# Patient Record
Sex: MALE | Race: WHITE | NOT HISPANIC OR LATINO | Employment: OTHER | URBAN - METROPOLITAN AREA
[De-identification: names, ages, dates, MRNs, and addresses within clinical notes are randomized per-mention and may not be internally consistent; named-entity substitution may affect disease eponyms.]

---

## 2024-05-08 ENCOUNTER — APPOINTMENT (OUTPATIENT)
Dept: RADIOLOGY | Facility: HOSPITAL | Age: 49
End: 2024-05-08
Payer: COMMERCIAL

## 2024-05-08 ENCOUNTER — HOSPITAL ENCOUNTER (EMERGENCY)
Facility: HOSPITAL | Age: 49
Discharge: HOME | End: 2024-05-08
Attending: STUDENT IN AN ORGANIZED HEALTH CARE EDUCATION/TRAINING PROGRAM
Payer: COMMERCIAL

## 2024-05-08 VITALS
SYSTOLIC BLOOD PRESSURE: 166 MMHG | RESPIRATION RATE: 18 BRPM | TEMPERATURE: 98 F | WEIGHT: 205 LBS | HEART RATE: 77 BPM | HEIGHT: 68 IN | OXYGEN SATURATION: 100 % | DIASTOLIC BLOOD PRESSURE: 105 MMHG | BODY MASS INDEX: 31.07 KG/M2

## 2024-05-08 DIAGNOSIS — M79.632 PAIN OF LEFT FOREARM: ICD-10-CM

## 2024-05-08 DIAGNOSIS — T14.8XXA ANIMAL BITE: Primary | ICD-10-CM

## 2024-05-08 PROCEDURE — 90471 IMMUNIZATION ADMIN: CPT | Performed by: STUDENT IN AN ORGANIZED HEALTH CARE EDUCATION/TRAINING PROGRAM

## 2024-05-08 PROCEDURE — 2500000001 HC RX 250 WO HCPCS SELF ADMINISTERED DRUGS (ALT 637 FOR MEDICARE OP): Performed by: STUDENT IN AN ORGANIZED HEALTH CARE EDUCATION/TRAINING PROGRAM

## 2024-05-08 PROCEDURE — 2500000004 HC RX 250 GENERAL PHARMACY W/ HCPCS (ALT 636 FOR OP/ED): Performed by: STUDENT IN AN ORGANIZED HEALTH CARE EDUCATION/TRAINING PROGRAM

## 2024-05-08 PROCEDURE — 99283 EMERGENCY DEPT VISIT LOW MDM: CPT | Mod: 25 | Performed by: STUDENT IN AN ORGANIZED HEALTH CARE EDUCATION/TRAINING PROGRAM

## 2024-05-08 PROCEDURE — 99284 EMERGENCY DEPT VISIT MOD MDM: CPT | Performed by: STUDENT IN AN ORGANIZED HEALTH CARE EDUCATION/TRAINING PROGRAM

## 2024-05-08 PROCEDURE — 90715 TDAP VACCINE 7 YRS/> IM: CPT | Performed by: STUDENT IN AN ORGANIZED HEALTH CARE EDUCATION/TRAINING PROGRAM

## 2024-05-08 PROCEDURE — 73090 X-RAY EXAM OF FOREARM: CPT | Mod: LEFT SIDE | Performed by: RADIOLOGY

## 2024-05-08 PROCEDURE — 73090 X-RAY EXAM OF FOREARM: CPT | Mod: LT

## 2024-05-08 RX ORDER — AMOXICILLIN AND CLAVULANATE POTASSIUM 875; 125 MG/1; MG/1
1 TABLET, FILM COATED ORAL EVERY 12 HOURS
Qty: 20 TABLET | Refills: 0 | Status: SHIPPED | OUTPATIENT
Start: 2024-05-08 | End: 2024-05-18

## 2024-05-08 RX ORDER — HYDROCODONE BITARTRATE AND ACETAMINOPHEN 5; 325 MG/1; MG/1
1 TABLET ORAL EVERY 6 HOURS PRN
Qty: 12 TABLET | Refills: 0 | Status: SHIPPED | OUTPATIENT
Start: 2024-05-08 | End: 2024-05-11

## 2024-05-08 RX ORDER — AMOXICILLIN AND CLAVULANATE POTASSIUM 875; 125 MG/1; MG/1
1 TABLET, FILM COATED ORAL ONCE
Status: COMPLETED | OUTPATIENT
Start: 2024-05-08 | End: 2024-05-08

## 2024-05-08 RX ORDER — IBUPROFEN 200 MG
1 TABLET ORAL ONCE
Status: DISCONTINUED | OUTPATIENT
Start: 2024-05-08 | End: 2024-05-08 | Stop reason: HOSPADM

## 2024-05-08 RX ADMIN — AMOXICILLIN AND CLAVULANATE POTASSIUM 1 TABLET: 875; 125 TABLET, FILM COATED ORAL at 02:25

## 2024-05-08 RX ADMIN — TETANUS TOXOID, REDUCED DIPHTHERIA TOXOID AND ACELLULAR PERTUSSIS VACCINE, ADSORBED 0.5 ML: 5; 2.5; 8; 8; 2.5 SUSPENSION INTRAMUSCULAR at 01:51

## 2024-05-08 ASSESSMENT — LIFESTYLE VARIABLES
EVER HAD A DRINK FIRST THING IN THE MORNING TO STEADY YOUR NERVES TO GET RID OF A HANGOVER: NO
HAVE PEOPLE ANNOYED YOU BY CRITICIZING YOUR DRINKING: NO
TOTAL SCORE: 0
EVER FELT BAD OR GUILTY ABOUT YOUR DRINKING: NO
HAVE YOU EVER FELT YOU SHOULD CUT DOWN ON YOUR DRINKING: NO

## 2024-05-08 ASSESSMENT — PAIN DESCRIPTION - PAIN TYPE: TYPE: ACUTE PAIN

## 2024-05-08 ASSESSMENT — PAIN SCALES - GENERAL: PAINLEVEL_OUTOF10: 6

## 2024-05-08 ASSESSMENT — COLUMBIA-SUICIDE SEVERITY RATING SCALE - C-SSRS
1. IN THE PAST MONTH, HAVE YOU WISHED YOU WERE DEAD OR WISHED YOU COULD GO TO SLEEP AND NOT WAKE UP?: NO
6. HAVE YOU EVER DONE ANYTHING, STARTED TO DO ANYTHING, OR PREPARED TO DO ANYTHING TO END YOUR LIFE?: NO
2. HAVE YOU ACTUALLY HAD ANY THOUGHTS OF KILLING YOURSELF?: NO

## 2024-05-08 ASSESSMENT — PAIN - FUNCTIONAL ASSESSMENT: PAIN_FUNCTIONAL_ASSESSMENT: 0-10

## 2024-05-08 ASSESSMENT — PAIN DESCRIPTION - LOCATION: LOCATION: ARM

## 2024-05-08 NOTE — ED PROVIDER NOTES
HPI   Chief Complaint   Patient presents with    Animal Bite       This is a 48-year-old male, otherwise healthy with no chronic medical issues presents to the ED brought in by self for left-sided forearm pain, this started 3 hours ago after he was bitten by his neighbors dog when he was trying to break up a fight between his neighbors dog and his dog.  He was in his normal state of health prior to this.                          Merrill Coma Scale Score: 15                     Patient History   History reviewed. No pertinent past medical history.  History reviewed. No pertinent surgical history.  No family history on file.  Social History     Tobacco Use    Smoking status: Every Day     Current packs/day: 0.50     Types: Cigarettes    Smokeless tobacco: Never   Substance Use Topics    Alcohol use: Yes     Alcohol/week: 1.0 standard drink of alcohol     Types: 1 Cans of beer per week    Drug use: Yes     Types: Cocaine       Physical Exam   ED Triage Vitals [05/08/24 0008]   Temperature Heart Rate Respirations BP   36.7 °C (98 °F) 96 18 (!) 143/102      Pulse Ox Temp Source Heart Rate Source Patient Position   99 % Temporal Monitor Sitting      BP Location FiO2 (%)     Right arm --       Physical Exam  Constitutional:       Appearance: Normal appearance.   HENT:      Head: Normocephalic and atraumatic.      Mouth/Throat:      Mouth: Mucous membranes are moist.   Eyes:      Extraocular Movements: Extraocular movements intact.   Cardiovascular:      Rate and Rhythm: Normal rate and regular rhythm.      Heart sounds: Normal heart sounds. No murmur heard.  Pulmonary:      Effort: Pulmonary effort is normal. No respiratory distress.      Breath sounds: Normal breath sounds. No wheezing.   Abdominal:      General: There is no distension.      Palpations: Abdomen is soft.      Tenderness: There is no abdominal tenderness. There is no guarding.   Musculoskeletal:      Right lower leg: No edema.      Left lower leg: No edema.       Comments: There are 2 puncture wounds over the dorsum of the left forearm and 1 over the ulnar aspect of the left forearm distally.  Around the puncture wound of the left distal forearm on the ulnar side, there is hematoma noted approximately 3 cm that is tender to palpation.  He has intact sensation throughout the entire forearm,  strength is 5/5 bilaterally and he is able to flex and extend his fingers bilaterally.   Skin:     General: Skin is warm and dry.   Neurological:      General: No focal deficit present.      Mental Status: He is alert and oriented to person, place, and time.   Psychiatric:         Mood and Affect: Mood normal.         Behavior: Behavior normal.         ED Course & MDM   ED Course as of 05/08/24 0255   Wed May 08, 2024   0141 XR forearm left 2 views [ZH]      ED Course User Index  [ZH] Steven Armenta DO         Diagnoses as of 05/08/24 0255   Animal bite   Pain of left forearm       Medical Decision Making  Hemodynamically stable well-appearing comfortable on arrival to the ED.  He was bitten in the left forearm, we update his tetanus, based on his exam, lower concern for foreign body or fracture or compartment syndrome.  He has great  strength distally and he has intact sensation distally.  He is able to range all of his fingers and full flexion and extension without difficulty, which decreases my suspicion for tendon involvement.  Will obtain plain film    The plain film did demonstrate no acute osseous abnormality or radiopaque foreign body.  There was some soft tissue swelling noted.  His tetanus was updated and he got a dose of Augmentin in her stay in the ED.  He will be discharged home with outpatient hand surgery follow-up and given strict return precautions for any new or worsening symptoms such as worsening pain, loss of sensation.    He will be given Ortho/hand surgery follow-up in the next day.    Discussed with the attending  Isaac Giang DO PGY-4  Emergency  Medicine        Procedure  Procedures     Isaac Giang DO  Resident  05/08/24 0257

## 2024-05-08 NOTE — Clinical Note
Maurice Aminachapitoelijahmaki was seen and treated in our emergency department on 5/8/2024.  He may return to work on 05/10/2024.       If you have any questions or concerns, please don't hesitate to call.      Babak Godwin, DO

## 2024-05-08 NOTE — ED TRIAGE NOTES
Pt here for left arm dog bite. Pt was breaking up a dog fight between his dog and two pitballs. Bleeding controlled.

## 2024-05-08 NOTE — DISCHARGE INSTRUCTIONS
Please follow-up with the provided orthopedic surgeon.  Return to the emergency department if you develop any new, concerning, worsening symptoms such as difficulty with opening closing her hands, numbness in your fingers, severely worsening pain or fever/chills.  Take your antibiotics as prescribed.

## 2025-04-21 ENCOUNTER — APPOINTMENT (OUTPATIENT)
Dept: CARDIOLOGY | Facility: HOSPITAL | Age: 50
End: 2025-04-21
Payer: COMMERCIAL

## 2025-04-21 ENCOUNTER — HOSPITAL ENCOUNTER (EMERGENCY)
Facility: HOSPITAL | Age: 50
Discharge: LEFT BEFORE DISCHARGE | End: 2025-04-22
Attending: EMERGENCY MEDICINE
Payer: COMMERCIAL

## 2025-04-21 ENCOUNTER — APPOINTMENT (OUTPATIENT)
Dept: RADIOLOGY | Facility: HOSPITAL | Age: 50
End: 2025-04-21
Payer: COMMERCIAL

## 2025-04-21 VITALS
OXYGEN SATURATION: 98 % | DIASTOLIC BLOOD PRESSURE: 88 MMHG | TEMPERATURE: 97.5 F | RESPIRATION RATE: 20 BRPM | SYSTOLIC BLOOD PRESSURE: 150 MMHG | WEIGHT: 195 LBS | BODY MASS INDEX: 27.92 KG/M2 | HEART RATE: 62 BPM | HEIGHT: 70 IN

## 2025-04-21 DIAGNOSIS — I95.9 HYPOTENSION, UNSPECIFIED HYPOTENSION TYPE: ICD-10-CM

## 2025-04-21 DIAGNOSIS — R55 SYNCOPE, UNSPECIFIED SYNCOPE TYPE: ICD-10-CM

## 2025-04-21 DIAGNOSIS — M25.512 LEFT SHOULDER PAIN, UNSPECIFIED CHRONICITY: ICD-10-CM

## 2025-04-21 DIAGNOSIS — R00.1 BRADYCARDIA: ICD-10-CM

## 2025-04-21 DIAGNOSIS — S09.90XA CLOSED HEAD INJURY, INITIAL ENCOUNTER: Primary | ICD-10-CM

## 2025-04-21 LAB
ABO GROUP (TYPE) IN BLOOD: NORMAL
ALBUMIN SERPL BCP-MCNC: 4.2 G/DL (ref 3.4–5)
ALP SERPL-CCNC: 82 U/L (ref 33–120)
ALT SERPL W P-5'-P-CCNC: 17 U/L (ref 10–52)
AMPHETAMINES UR QL SCN: ABNORMAL
ANION GAP BLDV CALCULATED.4IONS-SCNC: 8 MMOL/L (ref 10–25)
ANION GAP SERPL CALC-SCNC: 10 MMOL/L (ref 10–20)
ANTIBODY SCREEN: NORMAL
AST SERPL W P-5'-P-CCNC: 18 U/L (ref 9–39)
BARBITURATES UR QL SCN: ABNORMAL
BASE EXCESS BLDV CALC-SCNC: -1.5 MMOL/L (ref -2–3)
BASOPHILS # BLD AUTO: 0.12 X10*3/UL (ref 0–0.1)
BASOPHILS NFR BLD AUTO: 1 %
BENZODIAZ UR QL SCN: ABNORMAL
BILIRUB SERPL-MCNC: 0.3 MG/DL (ref 0–1.2)
BODY TEMPERATURE: ABNORMAL
BUN SERPL-MCNC: 15 MG/DL (ref 6–23)
BZE UR QL SCN: ABNORMAL
CA-I BLDV-SCNC: 1.22 MMOL/L (ref 1.1–1.33)
CALCIUM SERPL-MCNC: 8.7 MG/DL (ref 8.6–10.3)
CANNABINOIDS UR QL SCN: ABNORMAL
CARDIAC TROPONIN I PNL SERPL HS: 5 NG/L (ref 0–20)
CARDIAC TROPONIN I PNL SERPL HS: 5 NG/L (ref 0–20)
CHLORIDE BLDV-SCNC: 105 MMOL/L (ref 98–107)
CHLORIDE SERPL-SCNC: 104 MMOL/L (ref 98–107)
CK SERPL-CCNC: 76 U/L (ref 0–325)
CO2 SERPL-SCNC: 25 MMOL/L (ref 21–32)
CREAT SERPL-MCNC: 1.2 MG/DL (ref 0.5–1.3)
EGFRCR SERPLBLD CKD-EPI 2021: 75 ML/MIN/1.73M*2
EOSINOPHIL # BLD AUTO: 0.36 X10*3/UL (ref 0–0.7)
EOSINOPHIL NFR BLD AUTO: 3 %
ERYTHROCYTE [DISTWIDTH] IN BLOOD BY AUTOMATED COUNT: 13 % (ref 11.5–14.5)
ETHANOL SERPL-MCNC: 42 MG/DL
FENTANYL+NORFENTANYL UR QL SCN: ABNORMAL
GLUCOSE BLDV-MCNC: 113 MG/DL (ref 74–99)
GLUCOSE SERPL-MCNC: 116 MG/DL (ref 74–99)
HCO3 BLDV-SCNC: 24.3 MMOL/L (ref 22–26)
HCT VFR BLD AUTO: 38.3 % (ref 41–52)
HCT VFR BLD EST: 39 % (ref 41–52)
HGB BLD-MCNC: 12.7 G/DL (ref 13.5–17.5)
HGB BLDV-MCNC: 12.9 G/DL (ref 13.5–17.5)
HOLD SPECIMEN: NORMAL
IMM GRANULOCYTES # BLD AUTO: 0.07 X10*3/UL (ref 0–0.7)
IMM GRANULOCYTES NFR BLD AUTO: 0.6 % (ref 0–0.9)
INHALED O2 CONCENTRATION: 28 %
LACTATE BLDV-SCNC: 2.2 MMOL/L (ref 0.4–2)
LYMPHOCYTES # BLD AUTO: 3.16 X10*3/UL (ref 1.2–4.8)
LYMPHOCYTES NFR BLD AUTO: 26.4 %
MCH RBC QN AUTO: 29.3 PG (ref 26–34)
MCHC RBC AUTO-ENTMCNC: 33.2 G/DL (ref 32–36)
MCV RBC AUTO: 88 FL (ref 80–100)
METHADONE UR QL SCN: ABNORMAL
MONOCYTES # BLD AUTO: 0.96 X10*3/UL (ref 0.1–1)
MONOCYTES NFR BLD AUTO: 8 %
NEUTROPHILS # BLD AUTO: 7.32 X10*3/UL (ref 1.2–7.7)
NEUTROPHILS NFR BLD AUTO: 61 %
NRBC BLD-RTO: 0 /100 WBCS (ref 0–0)
OPIATES UR QL SCN: ABNORMAL
OXYCODONE+OXYMORPHONE UR QL SCN: ABNORMAL
OXYHGB MFR BLDV: 52.1 % (ref 45–75)
PCO2 BLDV: 44 MM HG (ref 41–51)
PCP UR QL SCN: ABNORMAL
PH BLDV: 7.35 PH (ref 7.33–7.43)
PLATELET # BLD AUTO: 322 X10*3/UL (ref 150–450)
PO2 BLDV: 30 MM HG (ref 35–45)
POTASSIUM BLDV-SCNC: 3.4 MMOL/L (ref 3.5–5.3)
POTASSIUM SERPL-SCNC: 3.3 MMOL/L (ref 3.5–5.3)
PROT SERPL-MCNC: 6.3 G/DL (ref 6.4–8.2)
RBC # BLD AUTO: 4.34 X10*6/UL (ref 4.5–5.9)
RH FACTOR (ANTIGEN D): NORMAL
SAO2 % BLDV: 55 % (ref 45–75)
SODIUM BLDV-SCNC: 134 MMOL/L (ref 136–145)
SODIUM SERPL-SCNC: 136 MMOL/L (ref 136–145)
WBC # BLD AUTO: 12 X10*3/UL (ref 4.4–11.3)

## 2025-04-21 PROCEDURE — 86901 BLOOD TYPING SEROLOGIC RH(D): CPT | Performed by: EMERGENCY MEDICINE

## 2025-04-21 PROCEDURE — 93005 ELECTROCARDIOGRAM TRACING: CPT

## 2025-04-21 PROCEDURE — 72125 CT NECK SPINE W/O DYE: CPT | Performed by: RADIOLOGY

## 2025-04-21 PROCEDURE — 72125 CT NECK SPINE W/O DYE: CPT

## 2025-04-21 PROCEDURE — 82077 ASSAY SPEC XCP UR&BREATH IA: CPT | Performed by: EMERGENCY MEDICINE

## 2025-04-21 PROCEDURE — 2500000002 HC RX 250 W HCPCS SELF ADMINISTERED DRUGS (ALT 637 FOR MEDICARE OP, ALT 636 FOR OP/ED): Performed by: EMERGENCY MEDICINE

## 2025-04-21 PROCEDURE — 73030 X-RAY EXAM OF SHOULDER: CPT | Mod: BILATERAL PROCEDURE | Performed by: RADIOLOGY

## 2025-04-21 PROCEDURE — 2500000001 HC RX 250 WO HCPCS SELF ADMINISTERED DRUGS (ALT 637 FOR MEDICARE OP): Performed by: EMERGENCY MEDICINE

## 2025-04-21 PROCEDURE — G0390 TRAUMA RESPONS W/HOSP CRITI: HCPCS

## 2025-04-21 PROCEDURE — 71275 CT ANGIOGRAPHY CHEST: CPT

## 2025-04-21 PROCEDURE — 72170 X-RAY EXAM OF PELVIS: CPT | Performed by: RADIOLOGY

## 2025-04-21 PROCEDURE — 82550 ASSAY OF CK (CPK): CPT

## 2025-04-21 PROCEDURE — 73630 X-RAY EXAM OF FOOT: CPT | Mod: BILATERAL PROCEDURE | Performed by: RADIOLOGY

## 2025-04-21 PROCEDURE — 99285 EMERGENCY DEPT VISIT HI MDM: CPT | Mod: 25

## 2025-04-21 PROCEDURE — 70450 CT HEAD/BRAIN W/O DYE: CPT

## 2025-04-21 PROCEDURE — 2500000004 HC RX 250 GENERAL PHARMACY W/ HCPCS (ALT 636 FOR OP/ED): Mod: JZ | Performed by: EMERGENCY MEDICINE

## 2025-04-21 PROCEDURE — 96374 THER/PROPH/DIAG INJ IV PUSH: CPT

## 2025-04-21 PROCEDURE — 84484 ASSAY OF TROPONIN QUANT: CPT | Performed by: EMERGENCY MEDICINE

## 2025-04-21 PROCEDURE — 2500000004 HC RX 250 GENERAL PHARMACY W/ HCPCS (ALT 636 FOR OP/ED): Mod: JZ

## 2025-04-21 PROCEDURE — 84075 ASSAY ALKALINE PHOSPHATASE: CPT | Performed by: EMERGENCY MEDICINE

## 2025-04-21 PROCEDURE — 2500000005 HC RX 250 GENERAL PHARMACY W/O HCPCS: Performed by: EMERGENCY MEDICINE

## 2025-04-21 PROCEDURE — 2550000001 HC RX 255 CONTRASTS: Performed by: EMERGENCY MEDICINE

## 2025-04-21 PROCEDURE — 85025 COMPLETE CBC W/AUTO DIFF WBC: CPT | Performed by: EMERGENCY MEDICINE

## 2025-04-21 PROCEDURE — 99291 CRITICAL CARE FIRST HOUR: CPT | Performed by: EMERGENCY MEDICINE

## 2025-04-21 PROCEDURE — 96375 TX/PRO/DX INJ NEW DRUG ADDON: CPT

## 2025-04-21 PROCEDURE — 71101 X-RAY EXAM UNILAT RIBS/CHEST: CPT | Mod: RT

## 2025-04-21 PROCEDURE — 84132 ASSAY OF SERUM POTASSIUM: CPT | Performed by: EMERGENCY MEDICINE

## 2025-04-21 PROCEDURE — 36415 COLL VENOUS BLD VENIPUNCTURE: CPT | Performed by: EMERGENCY MEDICINE

## 2025-04-21 PROCEDURE — 73030 X-RAY EXAM OF SHOULDER: CPT | Mod: 50

## 2025-04-21 PROCEDURE — 72170 X-RAY EXAM OF PELVIS: CPT

## 2025-04-21 PROCEDURE — 74177 CT ABD & PELVIS W/CONTRAST: CPT

## 2025-04-21 PROCEDURE — 99285 EMERGENCY DEPT VISIT HI MDM: CPT | Mod: 25 | Performed by: EMERGENCY MEDICINE

## 2025-04-21 PROCEDURE — 96361 HYDRATE IV INFUSION ADD-ON: CPT

## 2025-04-21 PROCEDURE — 80307 DRUG TEST PRSMV CHEM ANLYZR: CPT | Performed by: EMERGENCY MEDICINE

## 2025-04-21 PROCEDURE — 71101 X-RAY EXAM UNILAT RIBS/CHEST: CPT | Mod: RIGHT SIDE | Performed by: RADIOLOGY

## 2025-04-21 PROCEDURE — 70450 CT HEAD/BRAIN W/O DYE: CPT | Performed by: RADIOLOGY

## 2025-04-21 RX ORDER — DULOXETIN HYDROCHLORIDE 60 MG/1
60 CAPSULE, DELAYED RELEASE ORAL NIGHTLY
COMMUNITY

## 2025-04-21 RX ORDER — FENTANYL CITRATE 50 UG/ML
25 INJECTION, SOLUTION INTRAMUSCULAR; INTRAVENOUS ONCE
Status: COMPLETED | OUTPATIENT
Start: 2025-04-21 | End: 2025-04-21

## 2025-04-21 RX ORDER — ORPHENADRINE CITRATE 100 MG/1
100 TABLET, EXTENDED RELEASE ORAL ONCE
Status: COMPLETED | OUTPATIENT
Start: 2025-04-21 | End: 2025-04-21

## 2025-04-21 RX ORDER — LIDOCAINE 560 MG/1
1 PATCH PERCUTANEOUS; TOPICAL; TRANSDERMAL ONCE
Status: DISCONTINUED | OUTPATIENT
Start: 2025-04-21 | End: 2025-04-22 | Stop reason: HOSPADM

## 2025-04-21 RX ORDER — CYCLOBENZAPRINE HCL 10 MG
10 TABLET ORAL 3 TIMES DAILY PRN
COMMUNITY

## 2025-04-21 RX ORDER — METAXALONE 800 MG/1
800 TABLET ORAL EVERY 8 HOURS PRN
Qty: 21 TABLET | Refills: 0 | Status: SHIPPED | OUTPATIENT
Start: 2025-04-21 | End: 2025-04-28

## 2025-04-21 RX ORDER — KETOROLAC TROMETHAMINE 15 MG/ML
15 INJECTION, SOLUTION INTRAMUSCULAR; INTRAVENOUS ONCE
Status: COMPLETED | OUTPATIENT
Start: 2025-04-21 | End: 2025-04-21

## 2025-04-21 RX ORDER — DULOXETIN HYDROCHLORIDE 30 MG/1
30 CAPSULE, DELAYED RELEASE ORAL NIGHTLY
COMMUNITY

## 2025-04-21 RX ORDER — LIDOCAINE 560 MG/1
1 PATCH PERCUTANEOUS; TOPICAL; TRANSDERMAL DAILY
Qty: 12 PATCH | Refills: 0 | Status: SHIPPED | OUTPATIENT
Start: 2025-04-21 | End: 2025-05-03

## 2025-04-21 RX ORDER — OXYCODONE AND ACETAMINOPHEN 5; 325 MG/1; MG/1
1 TABLET ORAL ONCE
Refills: 0 | Status: COMPLETED | OUTPATIENT
Start: 2025-04-21 | End: 2025-04-21

## 2025-04-21 RX ORDER — IBUPROFEN 600 MG/1
600 TABLET ORAL EVERY 6 HOURS PRN
Qty: 21 TABLET | Refills: 0 | Status: SHIPPED | OUTPATIENT
Start: 2025-04-21 | End: 2025-04-28

## 2025-04-21 RX ADMIN — OXYCODONE HYDROCHLORIDE AND ACETAMINOPHEN 1 TABLET: 5; 325 TABLET ORAL at 22:42

## 2025-04-21 RX ADMIN — ORPHENADRINE CITRATE 100 MG: 100 TABLET, EXTENDED RELEASE ORAL at 22:42

## 2025-04-21 RX ADMIN — LIDOCAINE 4% 1 PATCH: 40 PATCH TOPICAL at 22:34

## 2025-04-21 RX ADMIN — FENTANYL CITRATE 25 MCG: 50 INJECTION, SOLUTION INTRAMUSCULAR; INTRAVENOUS at 20:43

## 2025-04-21 RX ADMIN — KETOROLAC TROMETHAMINE 15 MG: 15 INJECTION, SOLUTION INTRAMUSCULAR; INTRAVENOUS at 22:34

## 2025-04-21 RX ADMIN — IOHEXOL 100 ML: 350 INJECTION, SOLUTION INTRAVENOUS at 20:10

## 2025-04-21 RX ADMIN — SODIUM CHLORIDE, SODIUM LACTATE, POTASSIUM CHLORIDE, AND CALCIUM CHLORIDE 1000 ML: .6; .31; .03; .02 INJECTION, SOLUTION INTRAVENOUS at 19:36

## 2025-04-21 RX ADMIN — SODIUM CHLORIDE, SODIUM LACTATE, POTASSIUM CHLORIDE, AND CALCIUM CHLORIDE 1000 ML: .6; .31; .03; .02 INJECTION, SOLUTION INTRAVENOUS at 20:31

## 2025-04-21 ASSESSMENT — LIFESTYLE VARIABLES
EVER HAD A DRINK FIRST THING IN THE MORNING TO STEADY YOUR NERVES TO GET RID OF A HANGOVER: YES
EVER FELT BAD OR GUILTY ABOUT YOUR DRINKING: NO
TOTAL SCORE: 2
HAVE PEOPLE ANNOYED YOU BY CRITICIZING YOUR DRINKING: NO
HAVE YOU EVER FELT YOU SHOULD CUT DOWN ON YOUR DRINKING: YES

## 2025-04-21 ASSESSMENT — PAIN DESCRIPTION - PAIN TYPE: TYPE: ACUTE PAIN

## 2025-04-21 ASSESSMENT — PAIN DESCRIPTION - ORIENTATION: ORIENTATION: LEFT

## 2025-04-21 ASSESSMENT — PAIN DESCRIPTION - LOCATION
LOCATION: OTHER (COMMENT)
LOCATION: OTHER (COMMENT)

## 2025-04-21 ASSESSMENT — PAIN - FUNCTIONAL ASSESSMENT: PAIN_FUNCTIONAL_ASSESSMENT: 0-10

## 2025-04-21 ASSESSMENT — PAIN SCALES - GENERAL
PAINLEVEL_OUTOF10: 8
PAINLEVEL_OUTOF10: 10 - WORST POSSIBLE PAIN
PAINLEVEL_OUTOF10: 7
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
## 2025-04-22 LAB
ATRIAL RATE: 59 BPM
P AXIS: 61 DEGREES
P OFFSET: 207 MS
P ONSET: 148 MS
PR INTERVAL: 146 MS
Q ONSET: 221 MS
Q ONSET: 222 MS
QRS COUNT: 10 BEATS
QRS COUNT: 9 BEATS
QRS DURATION: 108 MS
QRS DURATION: 114 MS
QT INTERVAL: 428 MS
QT INTERVAL: 464 MS
QTC CALCULATION(BAZETT): 423 MS
QTC CALCULATION(BAZETT): 443 MS
QTC FREDERICIA: 425 MS
QTC FREDERICIA: 450 MS
R AXIS: -10 DEGREES
R AXIS: -28 DEGREES
T AXIS: -5 DEGREES
T AXIS: 30 DEGREES
T OFFSET: 435 MS
T OFFSET: 454 MS
VENTRICULAR RATE: 55 BPM
VENTRICULAR RATE: 59 BPM

## 2025-08-05 ENCOUNTER — HOSPITAL ENCOUNTER (OUTPATIENT)
Dept: CARDIOLOGY | Facility: HOSPITAL | Age: 50
Discharge: HOME | End: 2025-08-05
Payer: COMMERCIAL

## 2025-08-05 ENCOUNTER — HOSPITAL ENCOUNTER (EMERGENCY)
Facility: HOSPITAL | Age: 50
Discharge: LEFT BEFORE DISCHARGE | End: 2025-08-05
Attending: EMERGENCY MEDICINE
Payer: COMMERCIAL

## 2025-08-05 VITALS
OXYGEN SATURATION: 100 % | HEIGHT: 71 IN | RESPIRATION RATE: 16 BRPM | TEMPERATURE: 98.6 F | BODY MASS INDEX: 25.2 KG/M2 | SYSTOLIC BLOOD PRESSURE: 111 MMHG | HEART RATE: 79 BPM | WEIGHT: 180 LBS | DIASTOLIC BLOOD PRESSURE: 71 MMHG

## 2025-08-05 DIAGNOSIS — R11.10 VOMITING, UNSPECIFIED VOMITING TYPE, UNSPECIFIED WHETHER NAUSEA PRESENT: Primary | ICD-10-CM

## 2025-08-05 LAB — GLUCOSE BLD MANUAL STRIP-MCNC: 134 MG/DL (ref 74–99)

## 2025-08-05 PROCEDURE — 99281 EMR DPT VST MAYX REQ PHY/QHP: CPT | Performed by: EMERGENCY MEDICINE

## 2025-08-05 PROCEDURE — 82947 ASSAY GLUCOSE BLOOD QUANT: CPT

## 2025-08-05 PROCEDURE — 99283 EMERGENCY DEPT VISIT LOW MDM: CPT

## 2025-08-05 PROCEDURE — 99282 EMERGENCY DEPT VISIT SF MDM: CPT

## 2025-08-05 PROCEDURE — 93005 ELECTROCARDIOGRAM TRACING: CPT

## 2025-08-05 PROCEDURE — 99284 EMERGENCY DEPT VISIT MOD MDM: CPT | Performed by: EMERGENCY MEDICINE

## 2025-08-05 RX ORDER — NALOXONE HYDROCHLORIDE 1 MG/ML
INJECTION INTRAMUSCULAR; INTRAVENOUS; SUBCUTANEOUS
Status: DISCONTINUED
Start: 2025-08-05 | End: 2025-08-05 | Stop reason: WASHOUT

## 2025-08-05 ASSESSMENT — PAIN SCALES - GENERAL: PAINLEVEL_OUTOF10: 0 - NO PAIN

## 2025-08-05 ASSESSMENT — PAIN - FUNCTIONAL ASSESSMENT: PAIN_FUNCTIONAL_ASSESSMENT: 0-10

## 2025-08-05 NOTE — ED TRIAGE NOTES
"Wife states altered mental staus, lethargy, vomiting. Decreased alertness. History of meth, fent, heroin use... \"pt states I dont do drugs\"  "

## 2025-08-05 NOTE — ED PROVIDER NOTES
Emergency Department Provider Note        History of Present Illness     History provided by: Patient and Family Member  Limitations to History: Uncooperative  External Records Reviewed with Brief Summary: None    HPI:  Maurice Hopkins is a 49 y.o. male to emergency department with concern for having vomiting yesterday and being clammy.  He has been doing a lot of yard work outside for the last 4 days.  This morning his wife noticed that he was snoring more than normal and she thought he was sweaty and pale.  She states that he was having a hard time staying awake.  Therefore she brought him to the emergency department for further evaluation.  Wife does assist in the history.  Patient is able to answer questions himself.  He does admit to being sweaty without any fevers or chills.  Denies any sore throat or congestion.  Denies any chest pain or shortness of breath.  Denies any cough or abdominal pain.  He did have the nausea and vomiting yesterday.  No diarrhea.  No urinary issues.  No headaches.    He does have a history of hypertension and prior drug abuse.    No recent surgeries.  No known drug allergies  He does smoke tobacco.  He has been sober for 85 days.  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    Physical Exam   Triage vitals:  T 37 °C (98.6 °F)  HR 79  /71  RR 16  O2 100 % None (Room air)      EXAM:  Vital signs reviewed  General:  Appears well  Alert to person place and time  HEENT:  Head atraumatic    NECK:  Normal inspection, no meningismus  RESPIRATORY:  No respiratory distress  CVS:    ABDOMEN/GI:    FEMALE GENITAL:  []  MALE GENITAL:  []  RECTAL:  []  BACK:    EXTREMITIES:  Full ROM  Normal appearance  NEURO:  Alert and oriented X 3  CN's normal as observed  Motor normal  PSYCH:  Mood agitated  Affect normal  SKIN:  Color normal  No rash  Warm  Dry    Medical Decision Making & ED Course   Medical Decision Makin y.o. male to ER with vomiting yesterday and being  sweaty and pale.    When discussing patient's history and review of systems he states he does not want to be here.  His wife wants him to be here.  Patient became increasingly uncooperative.    Glucose was 134 in triage    Patient states he does not want to be evaluated does not want to be here.    He did not cooperate for physical exam.    Patient is awake and alert and oriented to person place and time.  He knew his age and the year and the month.  He knew he is in the hospital.  He understands that we do not evaluate him here there could be something dangerous that we are missing.  He still refusing evaluation.  I did attempt to convince him to get evaluated multiple times.  His wife was present and she wanted him evaluated but he was still refusing.  Wife does understand that he is alert and oriented to person place and time and he has decisional capacity and comprehension.    Patient did sign out AGAINST MEDICAL ADVICE.    They are aware they can return to the nearest emergency department anytime for further evaluation.      ----      Differential diagnoses considered include but are not limited to: Electrolyte disturbance, coronary artery disease, anemia, drug use, stroke, intracranial bleeding, heat exhaustion     Social Determinants of Health which Significantly Impact Care: Substance use disorder     EKG Independent Interpretation: Normal sinus rhythm with a rate of 80.  .  QTc 442.  No ST changes.    Independent Result Review and Interpretation: None obtained    Chronic conditions affecting the patient's care: As documented above in [facility]    The patient was discussed with the following consultants/services: None    Care Considerations: As documented above in [facility]    ED Course:     Disposition   The patient elected to leave the Emergency Department against medical advice. In my opinion, the patient has capacity to leave AMA. he is clinically sober, free from distracting injury, appears to have intact  insight, judgment, and reason, and in my opinion has capacity to make decisions. I explained to him that these symptoms may represent a serious underlying medical condition and he verbalized understanding of my concerns and understands the consequences of leaving without complete evaluation.    I had a discussion with the patient about his workup and results, and informed him what the next step in diagnosis and treatment would be, and he verbalized understanding. I explained the risks of leaving without further workup or treatment, which included reasonably foreseeable complications such as death, serious injury, and permanent disability. I also offered alternatives to departing AMA.    I have asked him to return as soon as possible to complete his evaluation, and also explained that he is welcome to return to the ED for further evaluation whenever he chooses. I have asked him to follow up with his primary doctor as soon as possible. All of his questions were answered and he was given oral discharge instructions.    Procedures   Procedures        Vargas Patrick DO  Emergency Medicine     Vargas Patrick DO  08/05/25 0831

## 2025-08-06 LAB
ATRIAL RATE: 80 BPM
P AXIS: 49 DEGREES
P OFFSET: 189 MS
P ONSET: 138 MS
PR INTERVAL: 164 MS
Q ONSET: 220 MS
QRS COUNT: 13 BEATS
QRS DURATION: 104 MS
QT INTERVAL: 384 MS
QTC CALCULATION(BAZETT): 442 MS
QTC FREDERICIA: 423 MS
R AXIS: -14 DEGREES
T AXIS: 28 DEGREES
T OFFSET: 412 MS
VENTRICULAR RATE: 80 BPM
